# Patient Record
Sex: MALE | Race: WHITE | NOT HISPANIC OR LATINO | URBAN - METROPOLITAN AREA
[De-identification: names, ages, dates, MRNs, and addresses within clinical notes are randomized per-mention and may not be internally consistent; named-entity substitution may affect disease eponyms.]

---

## 2018-09-08 ENCOUNTER — EMERGENCY (EMERGENCY)
Facility: HOSPITAL | Age: 68
LOS: 0 days | Discharge: HOME | End: 2018-09-08
Attending: EMERGENCY MEDICINE | Admitting: EMERGENCY MEDICINE

## 2018-09-08 VITALS
OXYGEN SATURATION: 95 % | HEIGHT: 68 IN | TEMPERATURE: 97 F | WEIGHT: 199.96 LBS | HEART RATE: 72 BPM | SYSTOLIC BLOOD PRESSURE: 130 MMHG | RESPIRATION RATE: 16 BRPM | DIASTOLIC BLOOD PRESSURE: 78 MMHG

## 2018-09-08 DIAGNOSIS — S00.86XA INSECT BITE (NONVENOMOUS) OF OTHER PART OF HEAD, INITIAL ENCOUNTER: ICD-10-CM

## 2018-09-08 DIAGNOSIS — F17.290 NICOTINE DEPENDENCE, OTHER TOBACCO PRODUCT, UNCOMPLICATED: ICD-10-CM

## 2018-09-08 DIAGNOSIS — Z90.49 ACQUIRED ABSENCE OF OTHER SPECIFIED PARTS OF DIGESTIVE TRACT: ICD-10-CM
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
## 2018-09-08 DIAGNOSIS — L03.211 CELLULITIS OF FACE: ICD-10-CM

## 2018-09-08 DIAGNOSIS — Z98.890 OTHER SPECIFIED POSTPROCEDURAL STATES: ICD-10-CM
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
## 2018-09-08 DIAGNOSIS — Z90.81 ACQUIRED ABSENCE OF SPLEEN: Chronic | ICD-10-CM
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
## 2018-09-08 DIAGNOSIS — Z84.89 FAMILY HISTORY OF OTHER SPECIFIED CONDITIONS: Chronic | ICD-10-CM
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
## 2018-09-08 RX ORDER — AZTREONAM 2 G
1 VIAL (EA) INJECTION
Qty: 20 | Refills: 0
Start: 2018-09-08 | End: 2018-09-17

## 2018-09-08 RX ORDER — CEPHALEXIN 500 MG
1 CAPSULE ORAL
Qty: 40 | Refills: 0
Start: 2018-09-08 | End: 2018-09-17

## 2018-09-08 RX ORDER — CEPHALEXIN 500 MG
500 CAPSULE ORAL ONCE
Qty: 0 | Refills: 0 | Status: COMPLETED | OUTPATIENT
Start: 2018-09-08 | End: 2018-09-08

## 2018-09-08 RX ORDER — DEXAMETHASONE 0.5 MG/5ML
10 ELIXIR ORAL ONCE
Qty: 0 | Refills: 0 | Status: COMPLETED | OUTPATIENT
Start: 2018-09-08 | End: 2018-09-08

## 2018-09-08 RX ADMIN — Medication 10 MILLIGRAM(S): at 08:27

## 2018-09-08 RX ADMIN — Medication 1 TABLET(S): at 08:26

## 2018-09-08 RX ADMIN — Medication 500 MILLIGRAM(S): at 08:27

## 2018-09-08 NOTE — ED PROVIDER NOTE - ATTENDING CONTRIBUTION TO CARE
I have personally performed a history and physical exam on this patient and personally directed the management of the patient. Patient presents for evaluation of right facial swelling located under the eye adfter being stung by a wasp 2 days prior he was using topical benadryl.  He denies any fevers or chills he denies any vomtiing he has no pain with eomi.  On physical exam the patient is nc/at perrla eomi he has an area of cellulitis under the right eyelid with no flucutance noted, no pain with eomi. oropharynx clear cta b/l, rrr s1s2 noted abd-soft nt n bs+ ext from with no fluctance noted I will discharge with po antibiotics, we discussed indication to return specifically worsening redness pain, visual changes headache fevers or chills.

## 2018-09-08 NOTE — ED ADULT TRIAGE NOTE - CHIEF COMPLAINT QUOTE
"I was stung by a wasp on Thursday night under my right eye. I've been putting Benadryl cream on it with no relief. The swelling got worse overnight."

## 2018-09-08 NOTE — ED PROVIDER NOTE - PHYSICAL EXAMINATION
Physical Exam    Vital Signs: I have reviewed the initial vital signs.  Constitutional: well-nourished, appears stated age, no acute distress  Eyes: Conjunctiva pink, Sclera clear, PERRLA, EOMI.  Cardiovascular: S1 and S2, regular rate, regular rhythm, well-perfused extremities, radial pulses equal and 2+  Respiratory: unlabored respiratory effort, clear to auscultation bilaterally no wheezing, rales and rhonchi  Integumentary: increased warmth, edema and redness under right eye. No tenderness with palpation.  Neurologic: awake, alert, cranial nerves II-XII grossly intact, extremities’ motor and sensory functions grossly intact  Psychiatric: appropriate mood, appropriate affect

## 2018-09-08 NOTE — ED PROVIDER NOTE - NS ED ROS FT
Constitutional: (-) fever  Eyes/ENT: (-) blurry vision, (-) epistaxis,   Musculoskeletal: (-) neck pain, (-) back pain, (-) joint pain  Integumentary: (+) rash, (+) edema  Neurological: (-) headache, (-) altered mental status  Allergic/Immunologic: (+) pruritus

## 2018-09-08 NOTE — ED PROVIDER NOTE - OBJECTIVE STATEMENT
Patient is a 68y Male presents to ED with increased swelling and redness under right eye following an wasp sting Thursday night. Patient has taken Advil and has used benadryl cream with limited relief. Patient states it is slightly itchy but denies tenderness, fever, and any visual changes.

## 2021-01-02 ENCOUNTER — EMERGENCY (EMERGENCY)
Facility: HOSPITAL | Age: 71
LOS: 0 days | Discharge: HOME | End: 2021-01-02
Attending: STUDENT IN AN ORGANIZED HEALTH CARE EDUCATION/TRAINING PROGRAM | Admitting: STUDENT IN AN ORGANIZED HEALTH CARE EDUCATION/TRAINING PROGRAM
Payer: MEDICARE

## 2021-01-02 VITALS
DIASTOLIC BLOOD PRESSURE: 80 MMHG | TEMPERATURE: 98 F | OXYGEN SATURATION: 96 % | HEART RATE: 85 BPM | RESPIRATION RATE: 18 BRPM | SYSTOLIC BLOOD PRESSURE: 140 MMHG

## 2021-01-02 VITALS — HEIGHT: 68 IN | WEIGHT: 175.93 LBS

## 2021-01-02 DIAGNOSIS — Z79.2 LONG TERM (CURRENT) USE OF ANTIBIOTICS: ICD-10-CM

## 2021-01-02 DIAGNOSIS — Z90.81 ACQUIRED ABSENCE OF SPLEEN: ICD-10-CM

## 2021-01-02 DIAGNOSIS — L03.116 CELLULITIS OF LEFT LOWER LIMB: ICD-10-CM

## 2021-01-02 DIAGNOSIS — M79.673 PAIN IN UNSPECIFIED FOOT: ICD-10-CM

## 2021-01-02 DIAGNOSIS — Z87.891 PERSONAL HISTORY OF NICOTINE DEPENDENCE: ICD-10-CM

## 2021-01-02 DIAGNOSIS — Z84.89 FAMILY HISTORY OF OTHER SPECIFIED CONDITIONS: Chronic | ICD-10-CM

## 2021-01-02 DIAGNOSIS — Z90.81 ACQUIRED ABSENCE OF SPLEEN: Chronic | ICD-10-CM

## 2021-01-02 PROBLEM — B19.20 UNSPECIFIED VIRAL HEPATITIS C WITHOUT HEPATIC COMA: Chronic | Status: ACTIVE | Noted: 2018-09-08

## 2021-01-02 PROCEDURE — 99283 EMERGENCY DEPT VISIT LOW MDM: CPT

## 2021-01-02 PROCEDURE — 73630 X-RAY EXAM OF FOOT: CPT | Mod: 26,LT

## 2021-01-02 NOTE — ED ADULT TRIAGE NOTE - CHIEF COMPLAINT QUOTE
while working in yard, hit left foot, now red, swollen and painful, went to urgent care and tsrated on keflex and bactrum last night, worsening today

## 2021-01-02 NOTE — ED PROVIDER NOTE - OBJECTIVE STATEMENT
70 year old male w no pmhx presents to the ED for evaluation of gradual onset constant mild left foot pain and erythema x 3 days. Pt states he was working in his garden the day of onset, however doesn't remember an inciting injury/trauma. Redness worsened yesterday, pt went to an [facility] and was started on Keflex and Bactrim which he did not start until 6 PM last night. Here today because erythema is spreading outside of border drawn at urgent care yesterday. Denies fevers/chills, n/v, gait difficulty, bleeding, abscess, purulent drainage, numbness, paresthesias, weakness.

## 2021-01-02 NOTE — ED PROVIDER NOTE - CLINICAL SUMMARY MEDICAL DECISION MAKING FREE TEXT BOX
70M who was recently diagnosed with cellulitis of his L foot, was started on keflex and bactrim, which he just started taking last night- 1st dose at 6pm, 2nd dose this morning, who presents with concern regarding to have spread just outside the [place] drawn on his foot. Pt denies n/v/d/f/c, inability to ambulate, focal weakness or numbness. Gen - NAD, Head - NCAT, TMs - clear b/l, Pharynx - clear, MMM, Heart - RRR, no m/g/r, Lungs - CTAB, no w/c/r, Abdomen - soft, NT, ND, Skin - No rash, Extremities - FROM,7x7cm area or erythema to left dorsum of foot, no edema, ecchymosis, Neuro - CN 2-12 intact, nl strength and sensation, nl gait. This is not treatment failure since pt has taken 2 doses (1 day) of abx. Encouraged cont abx, and gave return precautions/follow-up instructions. I have fully discussed the medical management and delivery of care with the patient. I have discussed any available labs, imaging and treatment options with the patient. All Questions answered at the bedside. Patient confirms understanding and has been given detailed return precautions. Patient instructed to return to the ED should symptoms persist or worsen. Patient has demonstrated capacity and has verbalized understanding. Patient is well appearing upon discharge, ambulatory with a steady gait.

## 2021-01-02 NOTE — ED PROVIDER NOTE - PATIENT PORTAL LINK FT
You can access the FollowMyHealth Patient Portal offered by [facility] by registering at the following website: [email or web address]. By joining [employer]’s FollowMyHealth portal, you will also be able to view your health information using other applications (apps) compatible with our system.

## 2021-01-02 NOTE — ED PROVIDER NOTE - NSFOLLOWUPINSTRUCTIONS_ED_ALL_ED_FT
Cellulitis    Cellulitis is a skin infection caused by bacteria. This condition occurs most often in the arms and lower legs but can occur anywhere over the body. Symptoms include redness, swelling, warm skin, tenderness, and chills/fever. If you were prescribed an antibiotic medicine, take it as told by your health care provider. Do not stop taking the antibiotic even if you start to feel better.    SEEK IMMEDIATE MEDICAL CARE IF YOU HAVE ANY OF THE FOLLOWING SYMPTOMS: worsening fever, red streaks coming from affected area, vomiting or diarrhea, or dizziness/lightheadedness. Cellulitis    Cellulitis is a skin infection caused by bacteria. This condition occurs most often in the arms and lower legs but can occur anywhere over the body. Symptoms include redness, swelling, warm skin, tenderness, and chills/fever. If you were prescribed an antibiotic medicine, take it as told by your health care provider. Do not stop taking the antibiotic even if you start to feel better.     SEEK IMMEDIATE MEDICAL CARE IF YOU HAVE ANY OF THE FOLLOWING SYMPTOMS: worsening fever, red streaks coming from affected area, vomiting or diarrhea, or dizziness/lightheadedness.

## 2021-01-02 NOTE — ED PROVIDER NOTE - NS ED ROS FT
CONSTITUTIONAL: (-) fevers, (-) chills, (-) malaise, (-) decreased appetite  CARDIO: (-) chest pain, (-) palpitations, (-) edema  PULM: (-) cough, (-) sputum, (-) chest tightness, (-) shortness of breath  GI: (-) nausea, (-) vomiting, (-) abdominal pain  ENDO: (-) polyuria, (-) polydipsia, (-) polyphagia  MSK: see HPI  SKIN: see HPI  NEURO: (-) weakness, (-) paresthesias, (-) numbness    *all other systems negative except as documented above and in the HPI*

## 2021-01-02 NOTE — ED PROVIDER NOTE - PHYSICAL EXAMINATION
VITALS:  I have reviewed the initial vital signs.  GENERAL: Well-developed, well-nourished, in no acute distress. Nontoxic.  HEENT: Sclera clear. EOMI, PERRLA. MMM.   NECK: supple w FROM.   CARDIO: RRR, nl S1 and S2. No murmurs, rubs, or gallops. 2+ pedal pulses bilaterally.  PULM: Normal effort. CTA b/l without wheezes, rales, or rhonchi.  MSK: Normal, steady gait. FROM to extremities x4. No joint swelling, erythema, deformity, or ttp.  SKIN: Warm, dry. 7x7 cm circular area of erythema with mild ttp over dorsum of left foot. no streaking, fluctuance, or induration.  NEURO: A&Ox3. Speech clear. 5/5 strength to lower extremities b/l. Sensation intact and equal throughout.   PSYCH: Calm and cooperative.

## 2021-01-08 ENCOUNTER — EMERGENCY (EMERGENCY)
Facility: HOSPITAL | Age: 71
LOS: 0 days | Discharge: HOME | End: 2021-01-08
Attending: EMERGENCY MEDICINE | Admitting: EMERGENCY MEDICINE
Payer: MEDICARE

## 2021-01-08 VITALS
SYSTOLIC BLOOD PRESSURE: 119 MMHG | DIASTOLIC BLOOD PRESSURE: 58 MMHG | RESPIRATION RATE: 20 BRPM | OXYGEN SATURATION: 98 % | TEMPERATURE: 97 F | HEART RATE: 79 BPM

## 2021-01-08 VITALS — HEIGHT: 68 IN | WEIGHT: 190.04 LBS

## 2021-01-08 DIAGNOSIS — Z79.899 OTHER LONG TERM (CURRENT) DRUG THERAPY: ICD-10-CM

## 2021-01-08 DIAGNOSIS — Z90.81 ACQUIRED ABSENCE OF SPLEEN: Chronic | ICD-10-CM

## 2021-01-08 DIAGNOSIS — M79.673 PAIN IN UNSPECIFIED FOOT: ICD-10-CM

## 2021-01-08 DIAGNOSIS — Z90.49 ACQUIRED ABSENCE OF OTHER SPECIFIED PARTS OF DIGESTIVE TRACT: ICD-10-CM

## 2021-01-08 DIAGNOSIS — L03.116 CELLULITIS OF LEFT LOWER LIMB: ICD-10-CM

## 2021-01-08 DIAGNOSIS — Z84.89 FAMILY HISTORY OF OTHER SPECIFIED CONDITIONS: Chronic | ICD-10-CM

## 2021-01-08 PROCEDURE — 99283 EMERGENCY DEPT VISIT LOW MDM: CPT

## 2021-01-08 RX ORDER — CEPHALEXIN 500 MG
1 CAPSULE ORAL
Qty: 28 | Refills: 0
Start: 2021-01-08 | End: 2021-01-14

## 2021-01-08 RX ORDER — AZTREONAM 2 G
1 VIAL (EA) INJECTION
Qty: 14 | Refills: 0
Start: 2021-01-08 | End: 2021-01-14

## 2021-01-08 NOTE — ED ADULT TRIAGE NOTE - CHIEF COMPLAINT QUOTE
Came in for left foot infection. Patient states he finished his antibiotics but is not totally well.

## 2021-01-08 NOTE — ED PROVIDER NOTE - PROVIDER TOKENS
FREE:[LAST:[Dr Tse],PHONE:[phone]],FAX:[(   [phone]    -],ADDRESS:[[address] [place] [address]]]

## 2021-01-08 NOTE — ED ADULT NURSE NOTE - NSIMPLEMENTINTERV_GEN_ALL_ED
Implemented All Universal Safety Interventions:  [place] to call system. Call bell, personal items and telephone within reach. Instruct patient to call for assistance. Room bathroom lighting operational. Non-slip footwear when patient is off stretcher. Physically safe environment: no spills, clutter or unnecessary equipment. Stretcher in lowest position, wheels locked, appropriate side rails in place.

## 2021-01-08 NOTE — ED PROVIDER NOTE - PATIENT PORTAL LINK FT
You can access the FollowMyHealth Patient Portal offered by [facility] by registering at the following website: [email or web address]. By joining [employer]’s FollowMyHealth portal, you will also be able to view your health information using other applications (apps) compatible with our system.

## 2021-01-08 NOTE — ED PROVIDER NOTE - NSFOLLOWUPINSTRUCTIONS_ED_ALL_ED_FT
Cellulitis    Cellulitis is a skin infection caused by bacteria. This condition occurs most often in the arms and lower legs but can occur anywhere over the body. Symptoms include redness, swelling, warm skin, tenderness, and chills/fever. If you were prescribed an antibiotic medicine, take it as told by your health care provider. Do not stop taking the antibiotic even if you start to feel better.  See PMD 3-4 days     SEEK IMMEDIATE MEDICAL CARE IF YOU HAVE ANY OF THE FOLLOWING SYMPTOMS: worsening fever, red streaks coming from affected area, vomiting or diarrhea, or dizziness/lightheadedness.

## 2021-01-08 NOTE — ED PROVIDER NOTE - OBJECTIVE STATEMENT
Patient c/o infection left for 1 week, Seen by UC and place on kelfex and bacrtrim, Seen following day in ED< DC'd home to complete ABX. States foot much improved with less redness, swelling and pain but abx finished today and feels he still needs more. No fever, no numbness,

## 2021-01-08 NOTE — ED PROVIDER NOTE - CROS ED NEURO ALL NEG
Cough and intermittent fevers x 1 week    Sister with same symptoms    Mom has been giving robitussin and advil
negative...

## 2021-01-08 NOTE — ED PROVIDER NOTE - CARE PROVIDER_API CALL
Dr Tse,   [address] [facility]  Phone: [phone]  Fax: (   )    -  Follow Up Time:

## 2021-06-06 VITALS
TEMPERATURE: 101 F | HEIGHT: 68 IN | OXYGEN SATURATION: 94 % | SYSTOLIC BLOOD PRESSURE: 118 MMHG | WEIGHT: 160.06 LBS | RESPIRATION RATE: 18 BRPM | HEART RATE: 145 BPM | DIASTOLIC BLOOD PRESSURE: 60 MMHG

## 2021-06-06 LAB
ALBUMIN SERPL ELPH-MCNC: 3.8 G/DL — SIGNIFICANT CHANGE UP (ref 3.5–5.2)
ALP SERPL-CCNC: 49 U/L — SIGNIFICANT CHANGE UP (ref 30–115)
ALT FLD-CCNC: 13 U/L — SIGNIFICANT CHANGE UP (ref 0–41)
ANION GAP SERPL CALC-SCNC: 13 MMOL/L — SIGNIFICANT CHANGE UP (ref 7–14)
APPEARANCE UR: CLEAR — SIGNIFICANT CHANGE UP
AST SERPL-CCNC: 20 U/L — SIGNIFICANT CHANGE UP (ref 0–41)
BACTERIA # UR AUTO: ABNORMAL
BASOPHILS # BLD AUTO: 0.03 K/UL — SIGNIFICANT CHANGE UP (ref 0–0.2)
BASOPHILS NFR BLD AUTO: 0.1 % — SIGNIFICANT CHANGE UP (ref 0–1)
BILIRUB SERPL-MCNC: 0.5 MG/DL — SIGNIFICANT CHANGE UP (ref 0.2–1.2)
BILIRUB UR-MCNC: NEGATIVE — SIGNIFICANT CHANGE UP
BUN SERPL-MCNC: 24 MG/DL — HIGH (ref 10–20)
CA-I SERPL-SCNC: 1.15 MMOL/L — SIGNIFICANT CHANGE UP (ref 1.12–1.3)
CALCIUM SERPL-MCNC: 9.1 MG/DL — SIGNIFICANT CHANGE UP (ref 8.5–10.1)
CHLORIDE SERPL-SCNC: 100 MMOL/L — SIGNIFICANT CHANGE UP (ref 98–110)
CO2 SERPL-SCNC: 22 MMOL/L — SIGNIFICANT CHANGE UP (ref 17–32)
COLOR SPEC: YELLOW — SIGNIFICANT CHANGE UP
COMMENT - URINE: SIGNIFICANT CHANGE UP
CREAT SERPL-MCNC: 1.1 MG/DL — SIGNIFICANT CHANGE UP (ref 0.7–1.5)
DIFF PNL FLD: NEGATIVE — SIGNIFICANT CHANGE UP
EOSINOPHIL # BLD AUTO: 0 K/UL — SIGNIFICANT CHANGE UP (ref 0–0.7)
EOSINOPHIL NFR BLD AUTO: 0 % — SIGNIFICANT CHANGE UP (ref 0–8)
EPI CELLS # UR: ABNORMAL /HPF
GAS PNL BLDV: 137 MMOL/L — SIGNIFICANT CHANGE UP (ref 136–145)
GAS PNL BLDV: SIGNIFICANT CHANGE UP
GLUCOSE SERPL-MCNC: 185 MG/DL — HIGH (ref 70–99)
GLUCOSE UR QL: NEGATIVE MG/DL — SIGNIFICANT CHANGE UP
HCO3 BLDV-SCNC: 24 MMOL/L — SIGNIFICANT CHANGE UP (ref 22–29)
HCT VFR BLD CALC: 43.8 % — SIGNIFICANT CHANGE UP (ref 42–52)
HGB BLD-MCNC: 15.3 G/DL — SIGNIFICANT CHANGE UP (ref 14–18)
IMM GRANULOCYTES NFR BLD AUTO: 1.1 % — HIGH (ref 0.1–0.3)
INR BLD: 1.29 RATIO — SIGNIFICANT CHANGE UP (ref 0.65–1.3)
KETONES UR-MCNC: NEGATIVE — SIGNIFICANT CHANGE UP
LACTATE BLDV-MCNC: 2.7 MMOL/L — HIGH (ref 0.5–1.6)
LACTATE SERPL-SCNC: 2.6 MMOL/L — HIGH (ref 0.7–2)
LEUKOCYTE ESTERASE UR-ACNC: NEGATIVE — SIGNIFICANT CHANGE UP
LYMPHOCYTES # BLD AUTO: 0.7 K/UL — LOW (ref 1.2–3.4)
LYMPHOCYTES # BLD AUTO: 3.3 % — LOW (ref 20.5–51.1)
MCHC RBC-ENTMCNC: 33.2 PG — HIGH (ref 27–31)
MCHC RBC-ENTMCNC: 34.9 G/DL — SIGNIFICANT CHANGE UP (ref 32–37)
MCV RBC AUTO: 95 FL — HIGH (ref 80–94)
MONOCYTES # BLD AUTO: 1.04 K/UL — HIGH (ref 0.1–0.6)
MONOCYTES NFR BLD AUTO: 4.9 % — SIGNIFICANT CHANGE UP (ref 1.7–9.3)
NEUTROPHILS # BLD AUTO: 19.03 K/UL — HIGH (ref 1.4–6.5)
NEUTROPHILS NFR BLD AUTO: 90.6 % — HIGH (ref 42.2–75.2)
NITRITE UR-MCNC: NEGATIVE — SIGNIFICANT CHANGE UP
NRBC # BLD: 0 /100 WBCS — SIGNIFICANT CHANGE UP (ref 0–0)
PCO2 BLDV: 39 MMHG — LOW (ref 41–51)
PH BLDV: 7.4 — SIGNIFICANT CHANGE UP (ref 7.26–7.43)
PH UR: 5.5 — SIGNIFICANT CHANGE UP (ref 5–8)
PLATELET # BLD AUTO: 256 K/UL — SIGNIFICANT CHANGE UP (ref 130–400)
PO2 BLDV: 38 MMHG — SIGNIFICANT CHANGE UP (ref 20–40)
POTASSIUM BLDV-SCNC: 3.5 MMOL/L — SIGNIFICANT CHANGE UP (ref 3.3–5.6)
POTASSIUM SERPL-MCNC: 3.4 MMOL/L — LOW (ref 3.5–5)
POTASSIUM SERPL-SCNC: 3.4 MMOL/L — LOW (ref 3.5–5)
PROT SERPL-MCNC: 6.6 G/DL — SIGNIFICANT CHANGE UP (ref 6–8)
PROT UR-MCNC: 30 MG/DL
PROTHROM AB SERPL-ACNC: 14.8 SEC — HIGH (ref 9.95–12.87)
RAPID RVP RESULT: SIGNIFICANT CHANGE UP
RBC # BLD: 4.61 M/UL — LOW (ref 4.7–6.1)
RBC # FLD: 13.8 % — SIGNIFICANT CHANGE UP (ref 11.5–14.5)
RBC CASTS # UR COMP ASSIST: SIGNIFICANT CHANGE UP /HPF
SAO2 % BLDV: 79 % — SIGNIFICANT CHANGE UP
SARS-COV-2 RNA SPEC QL NAA+PROBE: SIGNIFICANT CHANGE UP
SODIUM SERPL-SCNC: 135 MMOL/L — SIGNIFICANT CHANGE UP (ref 135–146)
SP GR SPEC: 1.02 — SIGNIFICANT CHANGE UP (ref 1.01–1.03)
UROBILINOGEN FLD QL: 0.2 MG/DL — SIGNIFICANT CHANGE UP (ref 0.2–0.2)
WBC # BLD: 21.03 K/UL — HIGH (ref 4.8–10.8)
WBC # FLD AUTO: 21.03 K/UL — HIGH (ref 4.8–10.8)
WBC UR QL: SIGNIFICANT CHANGE UP /HPF

## 2021-06-06 PROCEDURE — 71045 X-RAY EXAM CHEST 1 VIEW: CPT | Mod: 26

## 2021-06-06 PROCEDURE — 93010 ELECTROCARDIOGRAM REPORT: CPT | Mod: NC

## 2021-06-06 PROCEDURE — 99285 EMERGENCY DEPT VISIT HI MDM: CPT

## 2021-06-06 RX ORDER — ACETAMINOPHEN 500 MG
975 TABLET ORAL ONCE
Refills: 0 | Status: COMPLETED | OUTPATIENT
Start: 2021-06-06 | End: 2021-06-06

## 2021-06-06 RX ORDER — CEFTRIAXONE 500 MG/1
1000 INJECTION, POWDER, FOR SOLUTION INTRAMUSCULAR; INTRAVENOUS ONCE
Refills: 0 | Status: COMPLETED | OUTPATIENT
Start: 2021-06-06 | End: 2021-06-06

## 2021-06-06 RX ORDER — AZITHROMYCIN 500 MG/1
500 TABLET, FILM COATED ORAL ONCE
Refills: 0 | Status: COMPLETED | OUTPATIENT
Start: 2021-06-06 | End: 2021-06-06

## 2021-06-06 RX ORDER — SODIUM CHLORIDE 9 MG/ML
1000 INJECTION INTRAMUSCULAR; INTRAVENOUS; SUBCUTANEOUS ONCE
Refills: 0 | Status: COMPLETED | OUTPATIENT
Start: 2021-06-06 | End: 2021-06-06

## 2021-06-06 RX ORDER — SODIUM CHLORIDE 9 MG/ML
1300 INJECTION INTRAMUSCULAR; INTRAVENOUS; SUBCUTANEOUS ONCE
Refills: 0 | Status: COMPLETED | OUTPATIENT
Start: 2021-06-06 | End: 2021-06-06

## 2021-06-06 RX ADMIN — AZITHROMYCIN 255 MILLIGRAM(S): 500 TABLET, FILM COATED ORAL at 22:21

## 2021-06-06 RX ADMIN — Medication 975 MILLIGRAM(S): at 21:33

## 2021-06-06 RX ADMIN — CEFTRIAXONE 100 MILLIGRAM(S): 500 INJECTION, POWDER, FOR SOLUTION INTRAMUSCULAR; INTRAVENOUS at 22:21

## 2021-06-06 RX ADMIN — SODIUM CHLORIDE 1000 MILLILITER(S): 9 INJECTION INTRAMUSCULAR; INTRAVENOUS; SUBCUTANEOUS at 21:33

## 2021-06-06 RX ADMIN — SODIUM CHLORIDE 1300 MILLILITER(S): 9 INJECTION INTRAMUSCULAR; INTRAVENOUS; SUBCUTANEOUS at 22:21

## 2021-06-06 NOTE — ED PROVIDER NOTE - CLINICAL SUMMARY MEDICAL DECISION MAKING FREE TEXT BOX
Labs noted for WBC 21k.  UA negative.  Covid negative.  EKG NSR no acute changes.  CXR negative.  Given Rocephin and azithromycin.  Will admit.

## 2021-06-06 NOTE — ED PROVIDER NOTE - PHYSICAL EXAMINATION
PHYSICAL EXAM:    GENERAL: Alert, appears stated age, well appearing, non-toxic  SKIN: Warm, pink and dry. MMM.   HEAD: NC  EYE: Normal lids/conjunctiva  ENT: Normal hearing, patent oropharynx without erythema or exudate  NECK: +supple. No meningismus, or JVD  Pulm: Bilateral BS, normal resp effort, no wheezes, stridor, or retractions  CV: RRR, no M/R/G, 2+and = radial pulses  Abd: soft, non-tender, non-distended, no RUQ/RLQ/LLQ/LUQ TTP. no rebound/guarding. no CVA tenderness.   Mskel: no erythema, cyanosis, edema. no calf tenderness  Neuro: AAOx3, 5/5 strength throughout. normal gait. .

## 2021-06-06 NOTE — ED PROVIDER NOTE - OBJECTIVE STATEMENT
70 y/o M with PMH spherocytosis s/p splenectomy, Hep C, pshx cholecystecyomy presents with moderate constant global weakness x 2 days. +fever tmax 102, +nausea, 2 episodes non-bloody non-bilious vomiting, 1 episode non bloody diarrhea. no palliating/provoking factors. no rash/urinary sxs.   no cp/sob/ab pain/back pain.   +cough, unsure if it is chronic.   does not have PCP but follows with GI Dr. Cevallos like pcp  wife elaine: [phone] (home); [phone] (cell)

## 2021-06-06 NOTE — ED PROVIDER NOTE - ATTENDING CONTRIBUTION TO CARE
I personally evaluated the patient. I reviewed the Resident’s or Physician Assistant’s note (as assigned above), and agree with the findings and plan except as documented in my note.  Chart reviewed. H/O spherocytosis, splenectomy, presents to ED with fever, weakness, cough, vomiting and diarrhea.  Exam shows alert patient in no distress HEENT NCAT, neck supple, lungs clear, RR S1S2, abdomen soft NT +BS, no CCE.

## 2021-06-06 NOTE — ED PROVIDER NOTE - NS ED ROS FT
Review of Systems    Constitutional: (-) fever   Eyes/ENT: (-) vision changes  Cardiovascular: (-) chest pain, (-) syncope (-) palpitations  Respiratory: (-) shortness of breath  Gastrointestinal: (+) vomiting, (+) diarrhea (-)black/bloody stools (-) abdominal pain  Genitourinary:  (-) dysuria   Musculoskeletal: (-) neck pain, (-) back pain, (-) leg pain/swelling  Integumentary: (-) rash, (-) edema  Neurological: (-) headache, (-) confusion  Hematologic: (-) easy bruising   Allergic/Immunologic: (-) pruritus

## 2021-06-07 ENCOUNTER — INPATIENT (INPATIENT)
Facility: HOSPITAL | Age: 71
LOS: 1 days | Discharge: HOME | End: 2021-06-09
Attending: HOSPITALIST | Admitting: HOSPITALIST
Payer: MEDICARE

## 2021-06-07 DIAGNOSIS — R05 COUGH: ICD-10-CM

## 2021-06-07 DIAGNOSIS — A41.9 SEPSIS, UNSPECIFIED ORGANISM: ICD-10-CM

## 2021-06-07 DIAGNOSIS — Z90.81 ACQUIRED ABSENCE OF SPLEEN: Chronic | ICD-10-CM

## 2021-06-07 DIAGNOSIS — E87.6 HYPOKALEMIA: ICD-10-CM

## 2021-06-07 DIAGNOSIS — Z84.89 FAMILY HISTORY OF OTHER SPECIFIED CONDITIONS: Chronic | ICD-10-CM

## 2021-06-07 LAB
ANION GAP SERPL CALC-SCNC: 11 MMOL/L — SIGNIFICANT CHANGE UP (ref 7–14)
BUN SERPL-MCNC: 18 MG/DL — SIGNIFICANT CHANGE UP (ref 10–20)
CALCIUM SERPL-MCNC: 8.3 MG/DL — LOW (ref 8.5–10.1)
CHLORIDE SERPL-SCNC: 103 MMOL/L — SIGNIFICANT CHANGE UP (ref 98–110)
CO2 SERPL-SCNC: 22 MMOL/L — SIGNIFICANT CHANGE UP (ref 17–32)
CREAT SERPL-MCNC: 0.9 MG/DL — SIGNIFICANT CHANGE UP (ref 0.7–1.5)
GLUCOSE SERPL-MCNC: 122 MG/DL — HIGH (ref 70–99)
HCT VFR BLD CALC: 41.7 % — LOW (ref 42–52)
HCV AB S/CO SERPL IA: 74.23 COI — HIGH
HCV AB SERPL-IMP: REACTIVE
HGB BLD-MCNC: 14.5 G/DL — SIGNIFICANT CHANGE UP (ref 14–18)
LACTATE SERPL-SCNC: 1.4 MMOL/L — SIGNIFICANT CHANGE UP (ref 0.7–2)
MAGNESIUM SERPL-MCNC: 1.7 MG/DL — LOW (ref 1.8–2.4)
MCHC RBC-ENTMCNC: 33.3 PG — HIGH (ref 27–31)
MCHC RBC-ENTMCNC: 34.8 G/DL — SIGNIFICANT CHANGE UP (ref 32–37)
MCV RBC AUTO: 95.9 FL — HIGH (ref 80–94)
NRBC # BLD: 0 /100 WBCS — SIGNIFICANT CHANGE UP (ref 0–0)
PLATELET # BLD AUTO: 228 K/UL — SIGNIFICANT CHANGE UP (ref 130–400)
POTASSIUM SERPL-MCNC: 3.3 MMOL/L — LOW (ref 3.5–5)
POTASSIUM SERPL-SCNC: 3.3 MMOL/L — LOW (ref 3.5–5)
RBC # BLD: 4.35 M/UL — LOW (ref 4.7–6.1)
RBC # FLD: 14 % — SIGNIFICANT CHANGE UP (ref 11.5–14.5)
SODIUM SERPL-SCNC: 136 MMOL/L — SIGNIFICANT CHANGE UP (ref 135–146)
WBC # BLD: 21.99 K/UL — HIGH (ref 4.8–10.8)
WBC # FLD AUTO: 21.99 K/UL — HIGH (ref 4.8–10.8)

## 2021-06-07 PROCEDURE — 99223 1ST HOSP IP/OBS HIGH 75: CPT

## 2021-06-07 RX ORDER — ACETAMINOPHEN 500 MG
650 TABLET ORAL EVERY 6 HOURS
Refills: 0 | Status: DISCONTINUED | OUTPATIENT
Start: 2021-06-07 | End: 2021-06-09

## 2021-06-07 RX ORDER — CEFTRIAXONE 500 MG/1
1000 INJECTION, POWDER, FOR SOLUTION INTRAMUSCULAR; INTRAVENOUS EVERY 24 HOURS
Refills: 0 | Status: DISCONTINUED | OUTPATIENT
Start: 2021-06-07 | End: 2021-06-09

## 2021-06-07 RX ORDER — SODIUM CHLORIDE 9 MG/ML
1000 INJECTION INTRAMUSCULAR; INTRAVENOUS; SUBCUTANEOUS
Refills: 0 | Status: DISCONTINUED | OUTPATIENT
Start: 2021-06-07 | End: 2021-06-09

## 2021-06-07 RX ORDER — CHLORHEXIDINE GLUCONATE 213 G/1000ML
1 SOLUTION TOPICAL DAILY
Refills: 0 | Status: DISCONTINUED | OUTPATIENT
Start: 2021-06-07 | End: 2021-06-09

## 2021-06-07 RX ORDER — POTASSIUM CHLORIDE 20 MEQ
40 PACKET (EA) ORAL ONCE
Refills: 0 | Status: COMPLETED | OUTPATIENT
Start: 2021-06-07 | End: 2021-06-07

## 2021-06-07 RX ORDER — VANCOMYCIN HCL 1 G
1000 VIAL (EA) INTRAVENOUS EVERY 12 HOURS
Refills: 0 | Status: DISCONTINUED | OUTPATIENT
Start: 2021-06-07 | End: 2021-06-08

## 2021-06-07 RX ORDER — HEPARIN SODIUM 5000 [USP'U]/ML
5000 INJECTION INTRAVENOUS; SUBCUTANEOUS THREE TIMES A DAY
Refills: 0 | Status: DISCONTINUED | OUTPATIENT
Start: 2021-06-07 | End: 2021-06-09

## 2021-06-07 RX ORDER — ONDANSETRON 8 MG/1
4 TABLET, FILM COATED ORAL EVERY 6 HOURS
Refills: 0 | Status: DISCONTINUED | OUTPATIENT
Start: 2021-06-07 | End: 2021-06-09

## 2021-06-07 RX ADMIN — HEPARIN SODIUM 5000 UNIT(S): 5000 INJECTION INTRAVENOUS; SUBCUTANEOUS at 21:36

## 2021-06-07 RX ADMIN — Medication 250 MILLIGRAM(S): at 03:13

## 2021-06-07 RX ADMIN — ONDANSETRON 4 MILLIGRAM(S): 8 TABLET, FILM COATED ORAL at 04:00

## 2021-06-07 RX ADMIN — Medication 40 MILLIEQUIVALENT(S): at 06:27

## 2021-06-07 RX ADMIN — Medication 650 MILLIGRAM(S): at 04:44

## 2021-06-07 RX ADMIN — HEPARIN SODIUM 5000 UNIT(S): 5000 INJECTION INTRAVENOUS; SUBCUTANEOUS at 14:09

## 2021-06-07 RX ADMIN — SODIUM CHLORIDE 75 MILLILITER(S): 9 INJECTION INTRAMUSCULAR; INTRAVENOUS; SUBCUTANEOUS at 02:42

## 2021-06-07 RX ADMIN — Medication 650 MILLIGRAM(S): at 03:28

## 2021-06-07 RX ADMIN — Medication 250 MILLIGRAM(S): at 17:24

## 2021-06-07 RX ADMIN — Medication 40 MILLIEQUIVALENT(S): at 14:10

## 2021-06-07 RX ADMIN — SODIUM CHLORIDE 75 MILLILITER(S): 9 INJECTION INTRAMUSCULAR; INTRAVENOUS; SUBCUTANEOUS at 21:30

## 2021-06-07 RX ADMIN — HEPARIN SODIUM 5000 UNIT(S): 5000 INJECTION INTRAVENOUS; SUBCUTANEOUS at 06:27

## 2021-06-07 RX ADMIN — CEFTRIAXONE 100 MILLIGRAM(S): 500 INJECTION, POWDER, FOR SOLUTION INTRAMUSCULAR; INTRAVENOUS at 21:35

## 2021-06-07 NOTE — H&P ADULT - NSHPLABSRESULTS_GEN_ALL_CORE
15.3   21.03 )-----------( 256      ( 2021 21:29 )             43.8       -    135  |  100  |  24<H>  ----------------------------<  185<H>  3.4<L>   |  22  |  1.1    Ca    9.1      2021 21:29    TPro  6.6  /  Alb  3.8  /  TBili  0.5  /  DBili  x   /  AST  20  /  ALT  13  /  AlkPhos  49  -              Urinalysis Basic - ( 2021 23:40 )    Color: Yellow / Appearance: Clear / S.025 / pH: x  Gluc: x / Ketone: Negative  / Bili: Negative / Urobili: 0.2 mg/dL   Blood: x / Protein: 30 mg/dL / Nitrite: Negative   Leuk Esterase: Negative / RBC: 1-2 /HPF / WBC 1-2 /HPF   Sq Epi: x / Non Sq Epi: Few /HPF / Bacteria: Few        PT/INR - ( 2021 21:29 )   PT: 14.80 sec;   INR: 1.29 ratio             Lactate Trend  - @ 23:50 Lactate:1.4   - @ 21:29 Lactate:2.6             CAPILLARY BLOOD GLUCOSE

## 2021-06-07 NOTE — CONSULT NOTE ADULT - SUBJECTIVE AND OBJECTIVE BOX
Patient is a 71y old  Male with h/o Splenectomy. SPHEROCYTOSIS and Hep-C, presents to the ER for evaluation of fever. ON admission, he found to have fever, tachycardia and Leukocytosis. The urine  analysis and CXR is negative for infiltrate. He developed diarrhea and had a episode of vomiting  on the floor.  He has started on ceftriaxone and IV Vancomycin, and the ID consult requested to assist with further evaluation and antibiotic management.       REVIEW OF SYSTEMS: Total of twelve systems have been reviewed with patient and found to be negative unless mentioned in HPI      PAST MEDICAL & SURGICAL HISTORY:   SPHEROCYTOSIS   Hepatitis C  H/O splenectomy  FH: cholecystectomy      SOCIAL HISTORY  Alcohol: Does not drink  Tobacco: Does not smoke  Illicit substance use: None      FAMILY HISTORY: Non contributory to the present illness      ALLERGIES: No Known Allergies      Vital Signs Last 24 Hrs  T(C): 37.4 (2021 13:31), Max: 38.5 (2021 21:17)  T(F): 99.3 (2021 13:31), Max: 101.3 (2021 21:17)  HR: 91 (2021 13:31) (61 - 145)  BP: 130/60 (2021 13:31) (89/44 - 132/58)  BP(mean): --  RR: 16 (2021 13:31) (16 - 18)  SpO2: 94% (2021 05:09) (94% - 97%)      PHYSICAL EXAM:  GENERAL: Not in distress   CHEST/LUNG: Not using accessory muscles   HEART: s1 and s2 present  ABDOMEN:  Nontender and  Nondistended  EXTREMITIES: No pedal  edema  CNS: Awake and Alert        LABS:                        14.5   21.99 )-----------( 228      ( 2021 06:26 )             41.7       06-07    136  |  103  |  18  ----------------------------<  122<H>  3.3<L>   |  22  |  0.9    Ca    8.3<L>      2021 06:26  Mg     1.7     -07    TPro  6.6  /  Alb  3.8  /  TBili  0.5  /  DBili  x   /  AST  20  /  ALT  13  /  AlkPhos  49  06-06    PT/INR - ( 2021 21:29 )   PT: 14.80 sec;   INR: 1.29 ratio        Urinalysis Basic - ( 2021 23:40 )  Color: Yellow / Appearance: Clear / S.025 / pH: x  Gluc: x / Ketone: Negative  / Bili: Negative / Urobili: 0.2 mg/dL   Blood: x / Protein: 30 mg/dL / Nitrite: Negative   Leuk Esterase: Negative / RBC: 1-2 /HPF / WBC 1-2 /HPF   Sq Epi: x / Non Sq Epi: Few /HPF / Bacteria: Few        MEDICATIONS  (STANDING):  cefTRIAXone   IVPB 1000 milliGRAM(s) IV Intermittent every 24 hours  chlorhexidine 4% Liquid 1 Application(s) Topical daily  heparin   Injectable 5000 Unit(s) SubCutaneous three times a day  sodium chloride 0.9%. 1000 milliLiter(s) (75 mL/Hr) IV Continuous <Continuous>  vancomycin  IVPB 1000 milliGRAM(s) IV Intermittent every 12 hours    MEDICATIONS  (PRN):  acetaminophen   Tablet .. 650 milliGRAM(s) Oral every 6 hours PRN Temp greater or equal to 38C (100.4F)  ondansetron Injectable 4 milliGRAM(s) IV Push every 6 hours PRN Nausea        RADIOLOGY & ADDITIONAL TESTS:    rad< from: Xray Chest 1 View-PORTABLE IMMEDIATE (21 @ 22:03) >    Mild pulmonary vascular congestion    Lower lung field linear atelectasis        MICROBIOLOGY DATA:      Urine Microscopic-Add On (NC) (21 @ 23:40)   Red Blood Cell - Urine: 1-2 /HPF   White Blood Cell - Urine: 1-2 /HPF   Bacteria: Few   Comment - Urine: Many Mucus   Epithelial Cells: Few /HPF     Respiratory Viral Panel with COVID-19 by RENÉ (21 @ 21:29)   Rapid RVP Result: NotDete   SARS-CoV-2: NotDetec:                Patient is a 71y old  Male with h/o Splenectomy. SPHEROCYTOSIS and Hep-C, presents to the ER for evaluation of fever. ON admission, he found to have fever, tachycardia and Leukocytosis. The urine  analysis and CXR is negative for infiltrate. He developed diarrhea and had a episode of vomiting  on the floor.  He has started on ceftriaxone and IV Vancomycin, and the ID consult requested to assist with further evaluation and antibiotic management.       REVIEW OF SYSTEMS: Total of twelve systems have been reviewed with patient and found to be negative unless mentioned in HPI      PAST MEDICAL & SURGICAL HISTORY:   SPHEROCYTOSIS   Hepatitis C  H/O splenectomy  FH: cholecystectomy      SOCIAL HISTORY  Alcohol: Does not drink  Tobacco: Does not smoke  Illicit substance use: None      FAMILY HISTORY: Non contributory to the present illness      ALLERGIES: No Known Allergies      Vital Signs Last 24 Hrs  T(C): 37.4 (2021 13:31), Max: 38.5 (2021 21:17)  T(F): 99.3 (2021 13:31), Max: 101.3 (2021 21:17)  HR: 91 (2021 13:31) (61 - 145)  BP: 130/60 (2021 13:31) (89/44 - 132/58)  BP(mean): --  RR: 16 (2021 13:31) (16 - 18)  SpO2: 94% (2021 05:09) (94% - 97%)      PHYSICAL EXAM:  GENERAL: Not in distress   CHEST/LUNG: Not using accessory muscles   HEART: s1 and s2 present  ABDOMEN:  Nontender   EXTREMITIES: No pedal  edema  CNS: Awake and Alert        LABS:                        14.5   21.99 )-----------( 228      ( 2021 06:26 )             41.7       06-07    136  |  103  |  18  ----------------------------<  122<H>  3.3<L>   |  22  |  0.9    Ca    8.3<L>      2021 06:26  Mg     1.7     -07    TPro  6.6  /  Alb  3.8  /  TBili  0.5  /  DBili  x   /  AST  20  /  ALT  13  /  AlkPhos  49  06-06    PT/INR - ( 2021 21:29 )   PT: 14.80 sec;   INR: 1.29 ratio        Urinalysis Basic - ( 2021 23:40 )  Color: Yellow / Appearance: Clear / S.025 / pH: x  Gluc: x / Ketone: Negative  / Bili: Negative / Urobili: 0.2 mg/dL   Blood: x / Protein: 30 mg/dL / Nitrite: Negative   Leuk Esterase: Negative / RBC: 1-2 /HPF / WBC 1-2 /HPF   Sq Epi: x / Non Sq Epi: Few /HPF / Bacteria: Few        MEDICATIONS  (STANDING):  cefTRIAXone   IVPB 1000 milliGRAM(s) IV Intermittent every 24 hours  chlorhexidine 4% Liquid 1 Application(s) Topical daily  heparin   Injectable 5000 Unit(s) SubCutaneous three times a day  sodium chloride 0.9%. 1000 milliLiter(s) (75 mL/Hr) IV Continuous <Continuous>  vancomycin  IVPB 1000 milliGRAM(s) IV Intermittent every 12 hours    MEDICATIONS  (PRN):  acetaminophen   Tablet .. 650 milliGRAM(s) Oral every 6 hours PRN Temp greater or equal to 38C (100.4F)  ondansetron Injectable 4 milliGRAM(s) IV Push every 6 hours PRN Nausea        RADIOLOGY & ADDITIONAL TESTS:    rad< from: Xray Chest 1 View-PORTABLE IMMEDIATE (21 @ 22:03) >    Mild pulmonary vascular congestion    Lower lung field linear atelectasis        MICROBIOLOGY DATA:      Urine Microscopic-Add On (NC) (21 @ 23:40)   Red Blood Cell - Urine: 1-2 /HPF   White Blood Cell - Urine: 1-2 /HPF   Bacteria: Few   Comment - Urine: Many Mucus   Epithelial Cells: Few /HPF     Respiratory Viral Panel with COVID-19 by RENÉ (21 @ 21:29)   Rapid RVP Result: NotDete   SARS-CoV-2: NotDetec:

## 2021-06-07 NOTE — CHART NOTE - NSCHARTNOTEFT_GEN_A_CORE
Patient was seen by nocturnist tonight   admitted with sepsis of unclear etiology   patient reported episodes of vomiting yesterday and diarrhea today   will send stool culture, ova and parasite, cdiff - given high wbc count   on vanco and rocephin   ID consult pending   Attempted to call daughter Eva [phone] - left voice mail with call back number

## 2021-06-07 NOTE — H&P ADULT - NSHPPOADEEPVENOUSTHROMB_GEN_A_CORE
Use regular insulin for elevated blood sugars not controlled by your normal insulin.  Prednisone will have a tendency to elevate your  
no

## 2021-06-07 NOTE — H&P ADULT - HISTORY OF PRESENT ILLNESS
71 YEAR OLD MALE WITH PMH OF SPHEROCYTOSIS AND HEP C COME TO THE ER C/O FEVER FOR ONE DAY DURATION . PATIENT DENIES COUGH , N/V/D , ABDOMINAL PAIN OR BACK PAIN .

## 2021-06-07 NOTE — H&P ADULT - NSHPPHYSICALEXAM_GEN_ALL_CORE
Vital Signs Last 24 Hrs  T(C): 37.7 (06-07-21 @ 02:50)  T(F): 99.9 (06-07-21 @ 02:50), Max: 101.3 (06-06-21 @ 21:17)  HR: 100 (06-07-21 @ 02:50) (61 - 145)  BP: 132/58 (06-07-21 @ 02:50)  BP(mean): --  RR: 18 (06-06-21 @ 23:11) (18 - 18)  SpO2: 97% (06-07-21 @ 01:30) (94% - 97%)  Wt(kg): --

## 2021-06-07 NOTE — CONSULT NOTE ADULT - ASSESSMENT
Patient is a 71y old  Male with h/o Splenectomy. SPHEROCYTOSIS and Hep-C, presents to the ER for evaluation of fever. ON admission, he found to have fever, tachycardia and Leukocytosis. The urine  analysis and CXR is negative for infiltrate. He developed diarrhea and had a episode of vomiting  on the floor.  He has started on ceftriaxone and IV Vancomycin, and the ID consult requested to assist with further evaluation and antibiotic management.     # Sepsis ( Fever + tachycardia + Leukocytosis)- source most likely is GI  #  Gastroenteritis- Diarrhea and vomiting  # S/p Splenectomy      Would recommend:    1. Please follow up stool studies, namely C.diff, GI pathogen PCR and ova and parasites  2. Monitor WBC count  3. Follow up Blood cultures  4. Continue current Abx until work  up is done  5. Monitor Temp. and c/w supportive care      will follow the patient with you and make further recommendation based on the clinical course and Lab results  Thank you for the opportunity to participate in Mr. FULTON's care      Attending Attestation:    Spent more than 65 minutes on total encounter, more than 50 % of the visit was spent counseling and/or coordinating care by the Attending physician.             Patient is a 71y old  Male with h/o Splenectomy. SPHEROCYTOSIS and Hep-C, presents to the ER for evaluation of fever. ON admission, he found to have fever, tachycardia and Leukocytosis. The urine  analysis and CXR is negative for infiltrate. He developed diarrhea and had a episode of vomiting  on the floor.  He has started on ceftriaxone and IV Vancomycin, and the ID consult requested to assist with further evaluation and antibiotic management.     # Sepsis ( Fever + tachycardia + Leukocytosis)- source most likely is GI  #  Gastroenteritis- Diarrhea and vomiting  # S/p Splenectomy      Would recommend:    1. Please follow up stool studies, namely C.diff, GI pathogen PCR and ova and parasites  2. Monitor WBC count  3. Follow up Blood cultures  4. Continue current Abx until work  up is done  5. CT scan of abdomen and pelvis and Hep-C viral load      will follow the patient with you and make further recommendation based on the clinical course and Lab results  Thank you for the opportunity to participate in Mr. FULTON's care      Attending Attestation:    Spent more than 65 minutes on total encounter, more than 50 % of the visit was spent counseling and/or coordinating care by the Attending physician.             Patient is a 71y old  Male with h/o Splenectomy. SPHEROCYTOSIS and Hep-C, presents to the ER for evaluation of fever. ON admission, he found to have fever, tachycardia and Leukocytosis. The urine  analysis and CXR is negative for infiltrate. He developed diarrhea and had a episode of vomiting  on the floor.  He has started on ceftriaxone and IV Vancomycin, and the ID consult requested to assist with further evaluation and antibiotic management.     # Sepsis ( Fever + tachycardia + Leukocytosis)- source most likely is GI  #  Gastroenteritis- Diarrhea and vomiting  # S/p Splenectomy      Would recommend:    1. Please follow up stool studies, namely C.diff, GI pathogen PCR and ova and parasites  2. Monitor WBC count  3. Follow up Blood cultures  4. Continue current Abx until work  up is done  5. CT scan of abdomen and pelvis and Hep-C viral load    d/w patient and nursing  staff    will follow the patient with you and make further recommendation based on the clinical course and Lab results  Thank you for the opportunity to participate in Mr. FULTON's care      Attending Attestation:    Spent more than 65 minutes on total encounter, more than 50 % of the visit was spent counseling and/or coordinating care by the Attending physician.

## 2021-06-07 NOTE — H&P ADULT - PROBLEM SELECTOR PLAN 1
ADMIT TO THE MEDICAL FLOOR  ZITHROMAX IV  VANCOMYCIN IV ADMIT TO THE MEDICAL FLOOR  ZITHROMAX IV  VANCOMYCIN IV  PT WAS HYPOTENSIVE   S/P BOLUS NS   TYLENOL PO  START IV F  F/U BLOOD CULTURE  F/U PROCAL   ID CONSULT ADMIT TO THE MEDICAL FLOOR  PT WAS HYPOTENSIVE 90s/40s --> responded to fluid bolus  no clear source of infection   WBC: 21; T:102  CXR: no cardiopul disease noted  UA negative   CEFTRIAXONE IV  VANCOMYCIN IV  Start NS@75  TYLENOL PO  F/U BLOOD CULTURE  F/U PROCAL   ID CONSULT

## 2021-06-08 DIAGNOSIS — Z29.9 ENCOUNTER FOR PROPHYLACTIC MEASURES, UNSPECIFIED: ICD-10-CM

## 2021-06-08 DIAGNOSIS — B19.20 UNSPECIFIED VIRAL HEPATITIS C WITHOUT HEPATIC COMA: ICD-10-CM

## 2021-06-08 LAB
ANION GAP SERPL CALC-SCNC: 11 MMOL/L — SIGNIFICANT CHANGE UP (ref 7–14)
BASOPHILS # BLD AUTO: 0.05 K/UL — SIGNIFICANT CHANGE UP (ref 0–0.2)
BASOPHILS NFR BLD AUTO: 0.3 % — SIGNIFICANT CHANGE UP (ref 0–1)
BUN SERPL-MCNC: 8 MG/DL — LOW (ref 10–20)
C DIFF BY PCR RESULT: NEGATIVE — SIGNIFICANT CHANGE UP
C DIFF TOX GENS STL QL NAA+PROBE: SIGNIFICANT CHANGE UP
CALCIUM SERPL-MCNC: 8.2 MG/DL — LOW (ref 8.5–10.1)
CHLORIDE SERPL-SCNC: 101 MMOL/L — SIGNIFICANT CHANGE UP (ref 98–110)
CO2 SERPL-SCNC: 24 MMOL/L — SIGNIFICANT CHANGE UP (ref 17–32)
CREAT SERPL-MCNC: 0.8 MG/DL — SIGNIFICANT CHANGE UP (ref 0.7–1.5)
CULTURE RESULTS: SIGNIFICANT CHANGE UP
CULTURE RESULTS: SIGNIFICANT CHANGE UP
EOSINOPHIL # BLD AUTO: 0.01 K/UL — SIGNIFICANT CHANGE UP (ref 0–0.7)
EOSINOPHIL NFR BLD AUTO: 0.1 % — SIGNIFICANT CHANGE UP (ref 0–8)
ERYTHROCYTE [SEDIMENTATION RATE] IN BLOOD: 26 MM/HR — HIGH (ref 0–10)
GLUCOSE SERPL-MCNC: 133 MG/DL — HIGH (ref 70–99)
HCT VFR BLD CALC: 41.7 % — LOW (ref 42–52)
HGB BLD-MCNC: 14.6 G/DL — SIGNIFICANT CHANGE UP (ref 14–18)
IMM GRANULOCYTES NFR BLD AUTO: 0.6 % — HIGH (ref 0.1–0.3)
LYMPHOCYTES # BLD AUTO: 16.1 % — LOW (ref 20.5–51.1)
LYMPHOCYTES # BLD AUTO: 2.72 K/UL — SIGNIFICANT CHANGE UP (ref 1.2–3.4)
MCHC RBC-ENTMCNC: 33.6 PG — HIGH (ref 27–31)
MCHC RBC-ENTMCNC: 35 G/DL — SIGNIFICANT CHANGE UP (ref 32–37)
MCV RBC AUTO: 95.9 FL — HIGH (ref 80–94)
MONOCYTES # BLD AUTO: 1.45 K/UL — HIGH (ref 0.1–0.6)
MONOCYTES NFR BLD AUTO: 8.6 % — SIGNIFICANT CHANGE UP (ref 1.7–9.3)
NEUTROPHILS # BLD AUTO: 12.59 K/UL — HIGH (ref 1.4–6.5)
NEUTROPHILS NFR BLD AUTO: 74.3 % — SIGNIFICANT CHANGE UP (ref 42.2–75.2)
NRBC # BLD: 0 /100 WBCS — SIGNIFICANT CHANGE UP (ref 0–0)
PLATELET # BLD AUTO: 190 K/UL — SIGNIFICANT CHANGE UP (ref 130–400)
POTASSIUM SERPL-MCNC: 3.7 MMOL/L — SIGNIFICANT CHANGE UP (ref 3.5–5)
POTASSIUM SERPL-SCNC: 3.7 MMOL/L — SIGNIFICANT CHANGE UP (ref 3.5–5)
RBC # BLD: 4.35 M/UL — LOW (ref 4.7–6.1)
RBC # FLD: 14.4 % — SIGNIFICANT CHANGE UP (ref 11.5–14.5)
SODIUM SERPL-SCNC: 136 MMOL/L — SIGNIFICANT CHANGE UP (ref 135–146)
SPECIMEN SOURCE: SIGNIFICANT CHANGE UP
SPECIMEN SOURCE: SIGNIFICANT CHANGE UP
WBC # BLD: 16.93 K/UL — HIGH (ref 4.8–10.8)
WBC # FLD AUTO: 16.93 K/UL — HIGH (ref 4.8–10.8)

## 2021-06-08 PROCEDURE — 74177 CT ABD & PELVIS W/CONTRAST: CPT | Mod: 26

## 2021-06-08 PROCEDURE — 93010 ELECTROCARDIOGRAM REPORT: CPT | Mod: NC

## 2021-06-08 PROCEDURE — 99233 SBSQ HOSP IP/OBS HIGH 50: CPT

## 2021-06-08 RX ADMIN — HEPARIN SODIUM 5000 UNIT(S): 5000 INJECTION INTRAVENOUS; SUBCUTANEOUS at 05:40

## 2021-06-08 RX ADMIN — SODIUM CHLORIDE 75 MILLILITER(S): 9 INJECTION INTRAMUSCULAR; INTRAVENOUS; SUBCUTANEOUS at 21:00

## 2021-06-08 RX ADMIN — SODIUM CHLORIDE 75 MILLILITER(S): 9 INJECTION INTRAMUSCULAR; INTRAVENOUS; SUBCUTANEOUS at 09:32

## 2021-06-08 RX ADMIN — Medication 650 MILLIGRAM(S): at 13:44

## 2021-06-08 RX ADMIN — Medication 650 MILLIGRAM(S): at 14:57

## 2021-06-08 RX ADMIN — Medication 250 MILLIGRAM(S): at 05:40

## 2021-06-08 RX ADMIN — HEPARIN SODIUM 5000 UNIT(S): 5000 INJECTION INTRAVENOUS; SUBCUTANEOUS at 13:47

## 2021-06-08 RX ADMIN — CEFTRIAXONE 100 MILLIGRAM(S): 500 INJECTION, POWDER, FOR SOLUTION INTRAMUSCULAR; INTRAVENOUS at 21:20

## 2021-06-08 NOTE — PROGRESS NOTE ADULT - PROBLEM SELECTOR PLAN 1
- patient admitted with fever, leukocytosis likely from gastroenteritis  - reports vomiting prior to admission and episodes of diarrhea in hospital    - wbc trending down   - initially started on rocephin and vanco --> dc vanco as per ID   - cdiff negative   - follow remaining stool studies   - Hep C virus index 74.23- lfts wnl   - trend daily cmp   - follow CT A/P with IV contrast  - had low grade temp of 100.6 with sinus tachy on ekg   - tylenol prn

## 2021-06-08 NOTE — PROGRESS NOTE ADULT - TIME-BASED BILLING (NON-CRITICAL CARE)
Time-based billing (NON-critical care) Bi-Rhombic Flap Text: The defect edges were debeveled with a #15 scalpel blade.  Given the location of the defect and the proximity to free margins a bi-rhombic flap was deemed most appropriate.  Using a sterile surgical marker, an appropriate rhombic flap was drawn incorporating the defect. The area thus outlined was incised deep to adipose tissue with a #15 scalpel blade.  The skin margins were undermined to an appropriate distance in all directions utilizing iris scissors.

## 2021-06-09 VITALS
HEART RATE: 82 BPM | RESPIRATION RATE: 16 BRPM | SYSTOLIC BLOOD PRESSURE: 143 MMHG | TEMPERATURE: 98 F | DIASTOLIC BLOOD PRESSURE: 66 MMHG

## 2021-06-09 LAB
ALBUMIN SERPL ELPH-MCNC: 3.2 G/DL — LOW (ref 3.5–5.2)
ALP SERPL-CCNC: 60 U/L — SIGNIFICANT CHANGE UP (ref 30–115)
ALT FLD-CCNC: 26 U/L — SIGNIFICANT CHANGE UP (ref 0–41)
ANION GAP SERPL CALC-SCNC: 10 MMOL/L — SIGNIFICANT CHANGE UP (ref 7–14)
AST SERPL-CCNC: 33 U/L — SIGNIFICANT CHANGE UP (ref 0–41)
BASOPHILS # BLD AUTO: 0.05 K/UL — SIGNIFICANT CHANGE UP (ref 0–0.2)
BASOPHILS NFR BLD AUTO: 0.3 % — SIGNIFICANT CHANGE UP (ref 0–1)
BILIRUB SERPL-MCNC: 0.8 MG/DL — SIGNIFICANT CHANGE UP (ref 0.2–1.2)
BUN SERPL-MCNC: 8 MG/DL — LOW (ref 10–20)
CALCIUM SERPL-MCNC: 8 MG/DL — LOW (ref 8.5–10.1)
CHLORIDE SERPL-SCNC: 100 MMOL/L — SIGNIFICANT CHANGE UP (ref 98–110)
CO2 SERPL-SCNC: 26 MMOL/L — SIGNIFICANT CHANGE UP (ref 17–32)
COVID-19 SPIKE DOMAIN AB INTERP: POSITIVE
COVID-19 SPIKE DOMAIN ANTIBODY RESULT: >250 U/ML — HIGH
CREAT SERPL-MCNC: 0.8 MG/DL — SIGNIFICANT CHANGE UP (ref 0.7–1.5)
CRP SERPL-MCNC: 138 MG/L — HIGH
EOSINOPHIL # BLD AUTO: 0.01 K/UL — SIGNIFICANT CHANGE UP (ref 0–0.7)
EOSINOPHIL NFR BLD AUTO: 0.1 % — SIGNIFICANT CHANGE UP (ref 0–8)
GLUCOSE SERPL-MCNC: 96 MG/DL — SIGNIFICANT CHANGE UP (ref 70–99)
HCT VFR BLD CALC: 41 % — LOW (ref 42–52)
HGB BLD-MCNC: 14.2 G/DL — SIGNIFICANT CHANGE UP (ref 14–18)
IMM GRANULOCYTES NFR BLD AUTO: 0.5 % — HIGH (ref 0.1–0.3)
LYMPHOCYTES # BLD AUTO: 16 % — LOW (ref 20.5–51.1)
LYMPHOCYTES # BLD AUTO: 3.11 K/UL — SIGNIFICANT CHANGE UP (ref 1.2–3.4)
MCHC RBC-ENTMCNC: 33.2 PG — HIGH (ref 27–31)
MCHC RBC-ENTMCNC: 34.6 G/DL — SIGNIFICANT CHANGE UP (ref 32–37)
MCV RBC AUTO: 95.8 FL — HIGH (ref 80–94)
MONOCYTES # BLD AUTO: 1.65 K/UL — HIGH (ref 0.1–0.6)
MONOCYTES NFR BLD AUTO: 8.5 % — SIGNIFICANT CHANGE UP (ref 1.7–9.3)
NEUTROPHILS # BLD AUTO: 14.5 K/UL — HIGH (ref 1.4–6.5)
NEUTROPHILS NFR BLD AUTO: 74.6 % — SIGNIFICANT CHANGE UP (ref 42.2–75.2)
NRBC # BLD: 0 /100 WBCS — SIGNIFICANT CHANGE UP (ref 0–0)
PLATELET # BLD AUTO: 212 K/UL — SIGNIFICANT CHANGE UP (ref 130–400)
POTASSIUM SERPL-MCNC: 3.4 MMOL/L — LOW (ref 3.5–5)
POTASSIUM SERPL-SCNC: 3.4 MMOL/L — LOW (ref 3.5–5)
PROCALCITONIN SERPL-MCNC: 0.12 NG/ML — HIGH (ref 0.02–0.1)
PROT SERPL-MCNC: 5.8 G/DL — LOW (ref 6–8)
RBC # BLD: 4.28 M/UL — LOW (ref 4.7–6.1)
RBC # FLD: 14.1 % — SIGNIFICANT CHANGE UP (ref 11.5–14.5)
SARS-COV-2 IGG+IGM SERPL QL IA: >250 U/ML — HIGH
SARS-COV-2 IGG+IGM SERPL QL IA: POSITIVE
SODIUM SERPL-SCNC: 136 MMOL/L — SIGNIFICANT CHANGE UP (ref 135–146)
WBC # BLD: 19.41 K/UL — HIGH (ref 4.8–10.8)
WBC # FLD AUTO: 19.41 K/UL — HIGH (ref 4.8–10.8)

## 2021-06-09 PROCEDURE — 99239 HOSP IP/OBS DSCHRG MGMT >30: CPT

## 2021-06-09 RX ORDER — POTASSIUM CHLORIDE 20 MEQ
40 PACKET (EA) ORAL ONCE
Refills: 0 | Status: COMPLETED | OUTPATIENT
Start: 2021-06-09 | End: 2021-06-09

## 2021-06-09 RX ADMIN — Medication 40 MILLIEQUIVALENT(S): at 12:50

## 2021-06-09 NOTE — PROGRESS NOTE ADULT - SUBJECTIVE AND OBJECTIVE BOX
Eri Teran MD  Hospitalist   Spectra: [phone]    Patient is a 71y old  Male who presents with a chief complaint of 71 YEAR OLD MALE C/O FEVER . (2021 13:38)      INTERVAL HPI/OVERNIGHT EVENTS: no acute overnight events noted; patient reports feeling well; denies F/C today; reports his diarrhea is improving and that he only had 1 BM yesterday; denies any blood in the stool.       REVIEW OF SYSTEMS: All systems were reviewed and are otherwise negative  Vital Signs Last 24 Hrs  T(C): 37.3 (2021 05:00), Max: 38.1 (2021 13:44)  T(F): 99.2 (2021 05:00), Max: 100.6 (2021 13:44)  HR: 83 (2021 05:00) (83 - 171)  BP: 100/53 (2021 05:00) (100/53 - 108/66)  BP(mean): --  RR: 16 (2021 05:00) (16 - 16)  SpO2: --    PHYSICAL EXAM:  Gen: NAD  HEENT: Normocephalic; AT, EOMI, MMM  LUNGS - no wheezing, no rales, no ronchi  HEART: S1 S2+   ABDOMEN: Soft, Nontender, non distended  EXTREMITIES: no cyanosis; no edema  NERVOUS SYSTEM:  Awake and alert; no focal neuro deficits appreciated    LABS:                        14.2   19.41 )-----------( 212      ( 2021 06:06 )             41.0   06-    136  |  100  |  8<L>  ----------------------------<  96  3.4<L>   |  26  |  0.8    Ca    8.0<L>      2021 06:06    TPro  5.8<L>  /  Alb  3.2<L>  /  TBili  0.8  /  DBili  x   /  AST  33  /  ALT  26  /  AlkPhos  60  06-09         Urinalysis Basic - ( 2021 23:40 )    Color: Yellow / Appearance: Clear / S.025 / pH: x  Gluc: x / Ketone: Negative  / Bili: Negative / Urobili: 0.2 mg/dL   Blood: x / Protein: 30 mg/dL / Nitrite: Negative   Leuk Esterase: Negative / RBC: 1-2 /HPF / WBC 1-2 /HPF   Sq Epi: x / Non Sq Epi: Few /HPF / Bacteria: Few      CAPILLARY BLOOD GLUCOSE          MEDICATIONS  (STANDING):  cefTRIAXone   IVPB 1000 milliGRAM(s) IV Intermittent every 24 hours  chlorhexidine 4% Liquid 1 Application(s) Topical daily  heparin   Injectable 5000 Unit(s) SubCutaneous three times a day  potassium chloride    Tablet ER 40 milliEquivalent(s) Oral once  sodium chloride 0.9%. 1000 milliLiter(s) (75 mL/Hr) IV Continuous <Continuous>    MEDICATIONS  (PRN):  acetaminophen   Tablet .. 650 milliGRAM(s) Oral every 6 hours PRN Temp greater or equal to 38C (100.4F)  ondansetron Injectable 4 milliGRAM(s) IV Push every 6 hours PRN Nausea  
YON FULTONATORE  71y, Male  Allergy: No Known Allergies      LOS  1d    CHIEF COMPLAINT: 71 YEAR OLD MALE C/O FEVER . (2021 19:35)      INTERVAL EVENTS/HPI  - No acute events overnight  - T(F): , Max: 99.7 (21 @ 05:33)  - afebrile overnight, diarrhea improving  - WBC Count: 16.93 (21 @ 06:16)  WBC Count: 21.99 (21 @ 06:26)     - Creatinine, Serum: 0.8 (21 @ 06:16)  Creatinine, Serum: 0.9 (21 @ 06:26)       ROS  General: Denies rigors, nightsweats  HEENT: Denies headache, rhinorrhea, sore throat, eye pain  CV: Denies CP, palpitations  PULM: Denies wheezing, hemoptysis  GI: Denies hematemesis, hematochezia, melena  : Denies discharge, hematuria  MSK: Denies arthralgias, myalgias  SKIN: Denies rash, lesions  NEURO: Denies paresthesias, weakness  PSYCH: Denies depression, anxiety    VITALS:  T(F): 99.7, Max: 99.7 (21 @ 05:33)  HR: 130  BP: 125/59  RR: 16Vital Signs Last 24 Hrs  T(C): 37.6 (2021 05:33), Max: 37.6 (2021 05:33)  T(F): 99.7 (2021 05:33), Max: 99.7 (2021 05:33)  HR: 130 (2021 05:33) (83 - 130)  BP: 125/59 (2021 05:33) (122/58 - 125/59)  BP(mean): --  RR: 16 (2021 05:33) (16 - 16)  SpO2: --    PHYSICAL EXAM:  Gen: NAD, resting in bed  HEENT: Normocephalic, atraumatic  Neck: supple, no lymphadenopathy  CV: Regular rate & regular rhythm  Lungs: decreased BS at bases, no fremitus  Abdomen: Soft, BS present  Ext: Warm, well perfused  Neuro: non focal, awake  Skin: no rash, no erythema  Lines: no phlebitis    FH: Non-contributory  Social Hx: Non-contributory    TESTS & MEASUREMENTS:                        14.6   16.93 )-----------( 190      ( 2021 06:16 )             41.7         136  |  101  |  8<L>  ----------------------------<  133<H>  3.7   |  24  |  0.8    Ca    8.2<L>      2021 06:16  Mg     1.7         TPro  6.6  /  Alb  3.8  /  TBili  0.5  /  DBili  x   /  AST  20  /  ALT  13  /  AlkPhos  49      eGFR if Non African American: 89 mL/min/1.73M2 (21 @ 06:16)  eGFR if : 103 mL/min/1.73M2 (21 @ 06:16)    LIVER FUNCTIONS - ( 2021 21:29 )  Alb: 3.8 g/dL / Pro: 6.6 g/dL / ALK PHOS: 49 U/L / ALT: 13 U/L / AST: 20 U/L / GGT: x           Urinalysis Basic - ( 2021 23:40 )    Color: Yellow / Appearance: Clear / S.025 / pH: x  Gluc: x / Ketone: Negative  / Bili: Negative / Urobili: 0.2 mg/dL   Blood: x / Protein: 30 mg/dL / Nitrite: Negative   Leuk Esterase: Negative / RBC: 1-2 /HPF / WBC 1-2 /HPF   Sq Epi: x / Non Sq Epi: Few /HPF / Bacteria: Few        Culture - Urine (collected 21 @ 23:40)  Source: .Urine Clean Catch (Midstream)  Final Report (21 @ 13:31):    <10,000 CFU/mL Normal Urogenital Clover    Culture - Blood (collected 21 @ 21:29)  Source: .Blood Blood-Peripheral  Preliminary Report (21 @ 05:01):    No growth to date.    Culture - Blood (collected 21 @ 21:29)  Source: .Blood Blood-Peripheral  Preliminary Report (21 @ 05:01):    No growth to date.        Lactate, Blood: 1.4 mmol/L (21 @ 23:50)  Blood Gas Venous - Lactate: 2.7 mmoL/L (21 @ 22:09)  Lactate, Blood: 2.6 mmol/L (21 @ 21:29)      INFECTIOUS DISEASES TESTING  Rapid RVP Result: NotDetec (21 @ 21:29)      INFLAMMATORY MARKERS  Sedimentation Rate, Erythrocyte: 26 mm/Hr (21 @ 06:16)      RADIOLOGY & ADDITIONAL TESTS:  I have personally reviewed the last available Chest xray  CXR      CT      CARDIOLOGY TESTING  12 Lead ECG:   Ventricular Rate 89 BPM    Atrial Rate 89 BPM    P-R Interval 150 ms    QRS Duration 98 ms    Q-T Interval 376 ms    QTC Calculation(Bazett) 457 ms    P Axis 37 degrees    R Axis -10 degrees    T Axis 8 degrees    Diagnosis Line Normal sinus rhythm    Confirmed by CECE RAND MD ([id number]) on 2021 11:43:11 AM (21 @ 21:29)      MEDICATIONS  cefTRIAXone   IVPB 1000 IV Intermittent every 24 hours  chlorhexidine 4% Liquid 1 Topical daily  heparin   Injectable 5000 SubCutaneous three times a day  sodium chloride 0.9%. 1000 IV Continuous <Continuous>  vancomycin  IVPB 1000 IV Intermittent every 12 hours      WEIGHT  Weight (kg): 94.6 (21 @ 05:20)  Creatinine, Serum: 0.8 mg/dL (21 @ 06:16)      ANTIBIOTICS:  cefTRIAXone   IVPB 1000 milliGRAM(s) IV Intermittent every 24 hours  vancomycin  IVPB 1000 milliGRAM(s) IV Intermittent every 12 hours      All available historical records have been reviewed      
Eri Teran MD  Hospitalist   Spectra: [phone]    Patient is a 71y old  Male who presents with a chief complaint of 71 YEAR OLD MALE C/O FEVER . (2021 13:38)      INTERVAL HPI/OVERNIGHT EVENTS: no acute overnight events noted   had fever of 100.6 this afternoon with associated tachycardia   diarrhea improving     REVIEW OF SYSTEMS: negative  Vital Signs Last 24 Hrs  T(C): 36.7 (2021 16:29), Max: 38.1 (2021 13:44)  T(F): 98 (2021 16:29), Max: 100.6 (2021 13:44)  HR: 85 (2021 16:29) (83 - 171)  BP: 108/66 (2021 13:43) (108/66 - 125/59)  BP(mean): --  RR: 16 (2021 13:43) (16 - 16)  SpO2: --    PHYSICAL EXAM:   NAD; Normocephalic;   LUNGS - no wheezing  HEART: S1 S2+   ABDOMEN: Soft, Nontender, non distended  EXTREMITIES: no cyanosis; no edema  NERVOUS SYSTEM:  Awake and alert; no focal neuro deficits appreciated    LABS:                        14.6   16.93 )-----------( 190      ( 2021 06:16 )             41.7     06-08    136  |  101  |  8<L>  ----------------------------<  133<H>  3.7   |  24  |  0.8    Ca    8.2<L>      2021 06:16  Mg     1.7     06-07    TPro  6.6  /  Alb  3.8  /  TBili  0.5  /  DBili  x   /  AST  20  /  ALT  13  /  AlkPhos  49  06-06    PT/INR - ( 2021 21:29 )   PT: 14.80 sec;   INR: 1.29 ratio           Urinalysis Basic - ( 2021 23:40 )    Color: Yellow / Appearance: Clear / S.025 / pH: x  Gluc: x / Ketone: Negative  / Bili: Negative / Urobili: 0.2 mg/dL   Blood: x / Protein: 30 mg/dL / Nitrite: Negative   Leuk Esterase: Negative / RBC: 1-2 /HPF / WBC 1-2 /HPF   Sq Epi: x / Non Sq Epi: Few /HPF / Bacteria: Few      CAPILLARY BLOOD GLUCOSE          Medications:  MEDICATIONS  (STANDING):  cefTRIAXone   IVPB 1000 milliGRAM(s) IV Intermittent every 24 hours  chlorhexidine 4% Liquid 1 Application(s) Topical daily  heparin   Injectable 5000 Unit(s) SubCutaneous three times a day  sodium chloride 0.9%. 1000 milliLiter(s) (75 mL/Hr) IV Continuous <Continuous>    MEDICATIONS  (PRN):  acetaminophen   Tablet .. 650 milliGRAM(s) Oral every 6 hours PRN Temp greater or equal to 38C (100.4F)  ondansetron Injectable 4 milliGRAM(s) IV Push every 6 hours PRN Nausea

## 2021-06-09 NOTE — DISCHARGE NOTE PROVIDER - NSDCFUADDINST_GEN_ALL_CORE_FT
Things to follow up:  -Please drink plenty of fluids daily  -Follow up with your PCP in 1-2 weeks  -You will need a repeat CBC with your PCP to monitor improvement in white blood cell counts

## 2021-06-09 NOTE — DISCHARGE NOTE PROVIDER - HOSPITAL COURSE
Patient is a 71y old  Male with h/o Splenectomy. SPHEROCYTOSIS and Hep-C, presents to the ER for evaluation of fever. On preseneation, he had fever, tachycardia and Leukocytosis. The urine  analysis and CXR is negative for infiltrate. He developed diarrhea and had an episode of vomiting. He was started on broad spectrum antibiotics. He was evaluated by ID. His C. diff was negative. His stool culture grew camplobacter. He had a CT abd/pelvis with contrast which was negative for colitis and obstruction. At this time he is clinically improved and stable enough for a hospital discharge.    Things to follow up:  -Please drink plenty of fluids daily  -Follow up with your PCP in 1-2 weeks  -You will need a repeat CBC with your PCP to monitor improvement in white blood cell counts

## 2021-06-09 NOTE — PROGRESS NOTE ADULT - ASSESSMENT
Patient is a 71y old  Male with h/o Splenectomy. SPHEROCYTOSIS and Hep-C, presents to the ER for evaluation of fever. ON admission, he found to have fever, tachycardia and Leukocytosis. The urine  analysis and CXR is negative for infiltrate. He developed diarrhea and had a episode of vomiting  on the floor.  He has started on ceftriaxone and IV Vancomycin, and the ID consult requested to assist with further evaluation and antibiotic management.     # Sepsis ( Fever + tachycardia + Leukocytosis)- source most likely is GI    #  Gastroenteritis- Diarrhea and vomiting  - Blood Cx 6/6 NG  - Urine Cx 6/6 NG  - GI PCR negative  - C diff PCR negative     # S/p Splenectomy      Recommendations  - can stop vancomycin   - continue ceftriaxone   - follow-up CT scan of abdomen and pelvis  - Hep-C viral load    Please call or message on [employer] Teams if with any questions.  Spectra [phone]            
71 YEAR OLD MALE ADMIT TO THE MEDICAL FLOOR FOR SEPSIS N/V AND COUGH .
71 YEAR OLD MALE ADMIT TO THE MEDICAL FLOOR FOR SEPSIS N/V AND COUGH .

## 2021-06-09 NOTE — DISCHARGE NOTE NURSING/CASE MANAGEMENT/SOCIAL WORK - PATIENT PORTAL LINK FT
You can access the FollowMyHealth Patient Portal offered by [facility] by registering at the following website: [email or web address]. By joining [employer]’s FollowMyHealth portal, you will also be able to view your health information using other applications (apps) compatible with our system.

## 2021-06-09 NOTE — DISCHARGE NOTE PROVIDER - NSDCCPCAREPLAN_GEN_ALL_CORE_FT
PRINCIPAL DISCHARGE DIAGNOSIS  Diagnosis: Sepsis  Assessment and Plan of Treatment: On preseneation, he had fever, tachycardia and Leukocytosis. The urine  analysis and CXR is negative for infiltrate. He developed diarrhea and had an episode of vomiting. He was started on broad spectrum antibiotics. He was evaluated by ID. His C. diff was negative. His stool culture grew camplobacter. He had a CT abd/pelvis with contrast which was negative for colitis and obstruction.   Things to follow up:  -Please drink plenty of fluids daily  -Follow up with your PCP in 1-2 weeks  -You will need a repeat CBC with your PCP to monitor improvement in white blood cell counts      SECONDARY DISCHARGE DIAGNOSES  Diagnosis: Nausea & vomiting  Assessment and Plan of Treatment: improved    Diagnosis: Weakness  Assessment and Plan of Treatment: improved    Diagnosis: Cough  Assessment and Plan of Treatment:     Diagnosis: H/O splenectomy  Assessment and Plan of Treatment:

## 2021-06-09 NOTE — PROGRESS NOTE ADULT - PROBLEM SELECTOR PLAN 1
- patient admitted with fever, leukocytosis likely from gastroenteritis  - reports vomiting prior to admission and episodes of diarrhea in hospital    - wbc trending down   - cdiff negative   - follow remaining stool studies: +for campylobacter   - Hep C virus index 74.23- lfts wnl   - trend daily cmp   - tylenol prn  - CT abd/pelvis: negative for colitis  - will discharge patient; supportive care; spoke to ID; will stop abx

## 2021-06-09 NOTE — PROGRESS NOTE ADULT - PROBLEM SELECTOR PLAN 3
IMPROVE score of 2  heparin subq     Provider handoff:  Discharge patient today
IMPROVE score of 2  heparin subq     Provider handoff:  infectious work up in progress, ID on board

## 2021-06-10 LAB
CULTURE RESULTS: SIGNIFICANT CHANGE UP
CULTURE RESULTS: SIGNIFICANT CHANGE UP
SPECIMEN SOURCE: SIGNIFICANT CHANGE UP
SPECIMEN SOURCE: SIGNIFICANT CHANGE UP

## 2021-06-12 LAB
CULTURE RESULTS: SIGNIFICANT CHANGE UP
CULTURE RESULTS: SIGNIFICANT CHANGE UP
HCV RNA FLD QL NAA+PROBE: SIGNIFICANT CHANGE UP
SPECIMEN SOURCE: SIGNIFICANT CHANGE UP
SPECIMEN SOURCE: SIGNIFICANT CHANGE UP

## 2021-06-17 DIAGNOSIS — A41.9 SEPSIS, UNSPECIFIED ORGANISM: ICD-10-CM

## 2021-06-17 DIAGNOSIS — E87.6 HYPOKALEMIA: ICD-10-CM

## 2021-06-17 DIAGNOSIS — D58.0 HEREDITARY SPHEROCYTOSIS: ICD-10-CM

## 2021-06-17 DIAGNOSIS — Z86.19 PERSONAL HISTORY OF OTHER INFECTIOUS AND PARASITIC DISEASES: ICD-10-CM

## 2021-06-17 DIAGNOSIS — A04.5 CAMPYLOBACTER ENTERITIS: ICD-10-CM

## 2021-06-17 DIAGNOSIS — Z90.81 ACQUIRED ABSENCE OF SPLEEN: ICD-10-CM

## 2022-07-28 ENCOUNTER — EMERGENCY (EMERGENCY)
Facility: HOSPITAL | Age: 72
LOS: 0 days | Discharge: HOME | End: 2022-07-28
Attending: EMERGENCY MEDICINE | Admitting: EMERGENCY MEDICINE

## 2022-07-28 VITALS
TEMPERATURE: 98 F | WEIGHT: 205.03 LBS | DIASTOLIC BLOOD PRESSURE: 60 MMHG | RESPIRATION RATE: 17 BRPM | HEIGHT: 66 IN | HEART RATE: 82 BPM | OXYGEN SATURATION: 95 % | SYSTOLIC BLOOD PRESSURE: 122 MMHG

## 2022-07-28 DIAGNOSIS — S60.031A CONTUSION OF RIGHT MIDDLE FINGER WITHOUT DAMAGE TO NAIL, INITIAL ENCOUNTER: ICD-10-CM

## 2022-07-28 DIAGNOSIS — Z90.81 ACQUIRED ABSENCE OF SPLEEN: Chronic | ICD-10-CM
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
## 2022-07-28 DIAGNOSIS — Z84.89 FAMILY HISTORY OF OTHER SPECIFIED CONDITIONS: Chronic | ICD-10-CM

## 2022-07-28 PROCEDURE — 99283 EMERGENCY DEPT VISIT LOW MDM: CPT

## 2022-07-28 PROCEDURE — 73130 X-RAY EXAM OF HAND: CPT | Mod: 26,RT

## 2022-07-28 RX ORDER — CEPHALEXIN 500 MG
1 CAPSULE ORAL
Qty: 28 | Refills: 0
Start: 2022-07-28 | End: 2022-08-03

## 2022-07-28 NOTE — ED PROVIDER NOTE - OBJECTIVE STATEMENT
Patient is a 72-year-old male with no known medical history here for evaluation of possible bug bite to right lower back sustained yesterday.  Patient states he also was stung by the bug on his right third digit and has noticed increased bruising to the finger which is what prompted today's visit.  Patient denies fever, chills, weakness, numbness.

## 2022-07-28 NOTE — ED PROVIDER NOTE - ATTENDING APP SHARED VISIT CONTRIBUTION OF CARE
bugbite to finger with no retained stinger but +ecchymosis, pt concerned it is tracking up finger and wants abx, no other symptoms, FROm of finger with ecchymosis as above, also bite to back with local redness, will d/c, Rx given. Patient counseled regarding conditions which should prompt return.

## 2022-07-28 NOTE — ED PROVIDER NOTE - PHYSICAL EXAMINATION
VITAL SIGNS: I have reviewed nursing notes and confirm.  CONSTITUTIONAL: Well-developed; well-nourished; in no acute distress.   SKIN: mild ecchymosis to right 3rd digit, isolated wheal to right lower back Remainder of skin exam is warm and dry, no acute rash.    HEAD: Normocephalic; atraumatic.  EYES:  conjunctiva and sclera clear.  ABD: Normal bowel sounds; soft; non-distended; non-tender; no hepatosplenomegaly.  EXT: full ROM of affected digit Normal ROM.  No clubbing, cyanosis or edema.   NEURO: Alert, oriented, grossly unremarkable

## 2022-07-28 NOTE — ED PROVIDER NOTE - NS ED ROS FT
MS:  No myalgia, muscle weakness, joint pain or back pain.  Neuro:  No headache or weakness.  No LOC.  Skin:  + bug bite  Endocrine: No history of thyroid disease or diabetes.  Except as documented in the HPI,  all other systems are negative.

## 2022-07-28 NOTE — ED PROVIDER NOTE - NS ED ATTENDING STATEMENT MOD
This was a shared visit with the MYRA. I reviewed and verified the documentation and independently performed the documented:

## 2022-07-28 NOTE — ED ADULT TRIAGE NOTE - CHIEF COMPLAINT QUOTE
Pt c/o bug bite to lower back and RIGHT ring finger yesterday at 1715; c/o pain to finger and marking on finger.

## 2022-07-28 NOTE — ED PROVIDER NOTE - PATIENT PORTAL LINK FT
You can access the FollowMyHealth Patient Portal offered by [facility] by registering at the following website: [email or web address]. By joining [employer]’s FollowMyHealth portal, you will also be able to view your health information using other applications (apps) compatible with our system.

## 2024-07-04 NOTE — PATIENT PROFILE ADULT - PRO INTERPRETER NEED 2
swelling, deformity or tenderness  Neurological: alert, oriented, normal speech, no focal findings or movement disorder noted    Medications:    bumetanide  1 mg IntraVENous Daily    metoprolol tartrate  25 mg Oral BID    losartan  25 mg Oral Daily    aspirin  81 mg Oral Daily    buPROPion  150 mg Oral Daily    citalopram  40 mg Oral Daily    insulin glargine  10 Units SubCUTAneous Nightly    insulin lispro  0-4 Units SubCUTAneous TID WC    insulin lispro  0-4 Units SubCUTAneous Nightly    levothyroxine  150 mcg Oral QAM AC    oxyBUTYnin  10 mg Oral Daily    pantoprazole  40 mg Oral QAM AC    pravastatin  80 mg Oral Nightly    tamsulosin  0.4 mg Oral Daily    sodium chloride flush  5-40 mL IntraVENous 2 times per day    donepezil  10 mg Oral Nightly    gabapentin  200 mg Oral BID      dextrose      sodium chloride       perflutren lipid microspheres, 1.5 mL, ONCE PRN  glucose, 4 tablet, PRN  dextrose bolus, 125 mL, PRN   Or  dextrose bolus, 250 mL, PRN  glucagon (rDNA), 1 mg, PRN  dextrose, , Continuous PRN  sodium chloride flush, 5-40 mL, PRN  sodium chloride, , PRN  ondansetron, 4 mg, Q8H PRN   Or  ondansetron, 4 mg, Q6H PRN  acetaminophen, 650 mg, Q6H PRN   Or  acetaminophen, 650 mg, Q6H PRN  polyethylene glycol, 17 g, Daily PRN        Diagnostics:  [id number]      Echo: 7/3/2024  Interpreting Physicians  Performing Staff   Isaiah Do MD Tech/Nurse: Tegan Baird        Reason for Exam  Priority: Routine  Acute coronary syndrome   Dx: Acute coronary syndrome (HCC) [I24.9 (ICD-10-CM)]     PACS Images   Show images for Echo (TTE) complete (PRN contrast/bubble/strain/3D)  Interpretation Summary     Left Ventricle: Mildly reduced left ventricular systolic function with a visually estimated EF of 45 - 50%. Left ventricle size is normal. Normal wall thickness. Mild global hypokinesis present.    Left Atrium: Left atrium is mildly dilated.    Image quality is technically difficult. Technically difficult study    Component Value Date/Time    TRIG 329 06/02/2023 12:00 AM    HDL 33 06/02/2023 12:00 AM       TSH:    Lab Results   Component Value Date/Time    TSH 2.770 11/11/2023 12:46 AM         Assessment:  Atypical chest pain  Neg stress test 7/3/2024  Echo 7/2/2024 notes EF 45 - 50% (50 - 55%)  Denies further chest discomfort  Breathing stable  No volume on exam  VSS, HR and rhythm stable    Plan:  No further ischemic work-up at this time  Increase activity as tolerates  Continue Cozaar, ASA, Metoprolol  Stop IV bumex and resume home dose Lasix when ok with primary team  Follow-up in office with Dr. Spears in 3 - 4 weeks.     Cardiology will follow on prn basis.   Please call with questions or concerns.       Electronically signed by RELL Hadley CNP on 7/4/2024 at 11:02 AM             English

## 2024-09-09 ENCOUNTER — EMERGENCY (EMERGENCY)
Facility: HOSPITAL | Age: 74
LOS: 0 days | Discharge: HOME | End: 2024-09-09
Attending: EMERGENCY MEDICINE
Payer: MEDICARE

## 2024-09-09 VITALS
TEMPERATURE: 98 F | OXYGEN SATURATION: 97 % | HEIGHT: 70 IN | WEIGHT: 164.91 LBS | DIASTOLIC BLOOD PRESSURE: 81 MMHG | SYSTOLIC BLOOD PRESSURE: 145 MMHG | HEART RATE: 79 BPM | RESPIRATION RATE: 18 BRPM

## 2024-09-09 DIAGNOSIS — Z90.81 ACQUIRED ABSENCE OF SPLEEN: Chronic | ICD-10-CM

## 2024-09-09 DIAGNOSIS — M54.50 LOW BACK PAIN, UNSPECIFIED: ICD-10-CM

## 2024-09-09 DIAGNOSIS — Z84.89 FAMILY HISTORY OF OTHER SPECIFIED CONDITIONS: Chronic | ICD-10-CM

## 2024-09-09 PROCEDURE — 99284 EMERGENCY DEPT VISIT MOD MDM: CPT

## 2024-09-09 PROCEDURE — 99285 EMERGENCY DEPT VISIT HI MDM: CPT | Mod: 25

## 2024-09-09 PROCEDURE — 96374 THER/PROPH/DIAG INJ IV PUSH: CPT | Mod: XU

## 2024-09-09 RX ORDER — METHOCARBAMOL 750 MG/1
1000 TABLET, FILM COATED ORAL ONCE
Refills: 0 | Status: COMPLETED | OUTPATIENT
Start: 2024-09-09 | End: 2024-09-09

## 2024-09-09 RX ORDER — METHOCARBAMOL 750 MG/1
2 TABLET, FILM COATED ORAL
Qty: 24 | Refills: 0
Start: 2024-09-09 | End: 2024-09-12

## 2024-09-09 RX ORDER — IBUPROFEN 600 MG
1 TABLET ORAL
Qty: 12 | Refills: 0
Start: 2024-09-09 | End: 2024-09-12

## 2024-09-09 RX ORDER — IBUPROFEN 600 MG
1 TABLET ORAL
Refills: 0
Start: 2024-09-09 | End: 2024-09-12

## 2024-09-09 RX ORDER — KETOROLAC TROMETHAMINE 30 MG/ML
15 INJECTION, SOLUTION INTRAMUSCULAR ONCE
Refills: 0 | Status: DISCONTINUED | OUTPATIENT
Start: 2024-09-09 | End: 2024-09-09

## 2024-09-09 RX ADMIN — METHOCARBAMOL 1000 MILLIGRAM(S): 750 TABLET, FILM COATED ORAL at 19:12

## 2024-09-09 RX ADMIN — KETOROLAC TROMETHAMINE 15 MILLIGRAM(S): 30 INJECTION, SOLUTION INTRAMUSCULAR at 19:14

## 2024-09-09 NOTE — ED ADULT NURSE NOTE - NSFALLCONCLUSION_ED_ALL_ED
Called again and left message to call clinic back.Final letter mailed.   Universal Safety Interventions

## 2024-09-09 NOTE — ED PROVIDER NOTE - CLINICAL SUMMARY MEDICAL DECISION MAKING FREE TEXT BOX
74yoM here with low back pain and couple days after doing lower back exercises at gym. No trauma, no hx of DJD, no back surgeries, no recent f/c/s, no unintentional weight loss or any new weight loss, no tearing mid back pain or abd pain, no IV drug use, no hx of malignancy, not worse any any particular time of day. Denies LE weakness, numbness, tingling, saddle anesthesia, bowel/bladder incontinence or retention. on exam, nontoxic, vs noted   Back: midline, nontender in midline, b/l paralumbar mild ttp, no stepoffs; no CVA ttp  strength 5/5 in b/l hip flexion/extension, knee flexion/extension, ankle flexion/extension, great toe flexion/extension, sensation grossly intact, b/l achilles/patellar DTRs 2+, 2+ b/l DPs/PTs. Neg straight leg b/l   no red flags. In my opinion, based on current evaluation and results, an acute medical or surgical emergency does not appear to be occurring at this time and I feel that the pt is stable for further outpatient work up and/or treatment. Return precautions discussed.

## 2024-09-09 NOTE — ED PROVIDER NOTE - OBJECTIVE STATEMENT
74-year-old male presents to the ED for lower back pain.  Patient states on Thursday he had gone to the gym and used a new machine.  Patient does not think he was using the machine correctly and the next day he felt some strain of those lower back.  Patient states Motrin helps.  However he is only taking the Motrin at night

## 2024-09-09 NOTE — ED PROVIDER NOTE - PHYSICAL EXAMINATION
lumbar sacral spine No midline spinal tenderness.  There is positive lateral muscle spasm and tenderness bilateral

## 2024-09-09 NOTE — ED PROVIDER NOTE - PATIENT PORTAL LINK FT
You can access the FollowMyHealth Patient Portal offered by [facility] by registering at the following website: [email or web address]. By joining [employer] [facility]’s FollowMyHealth portal, you will also be able to view your health information using other applications (apps) compatible with our system.